# Patient Record
Sex: MALE | Race: WHITE | NOT HISPANIC OR LATINO
[De-identification: names, ages, dates, MRNs, and addresses within clinical notes are randomized per-mention and may not be internally consistent; named-entity substitution may affect disease eponyms.]

---

## 2017-02-06 ENCOUNTER — APPOINTMENT (OUTPATIENT)
Dept: NEUROLOGY | Facility: CLINIC | Age: 62
End: 2017-02-06

## 2017-02-06 VITALS
DIASTOLIC BLOOD PRESSURE: 88 MMHG | HEIGHT: 70 IN | SYSTOLIC BLOOD PRESSURE: 134 MMHG | HEART RATE: 80 BPM | BODY MASS INDEX: 45.1 KG/M2 | WEIGHT: 315 LBS

## 2017-06-23 ENCOUNTER — APPOINTMENT (OUTPATIENT)
Dept: NEUROLOGY | Facility: CLINIC | Age: 62
End: 2017-06-23

## 2017-12-18 ENCOUNTER — APPOINTMENT (OUTPATIENT)
Dept: NEUROLOGY | Facility: CLINIC | Age: 62
End: 2017-12-18

## 2018-02-27 ENCOUNTER — APPOINTMENT (OUTPATIENT)
Dept: NEUROLOGY | Facility: CLINIC | Age: 63
End: 2018-02-27
Payer: COMMERCIAL

## 2018-02-27 VITALS
SYSTOLIC BLOOD PRESSURE: 136 MMHG | HEIGHT: 70 IN | DIASTOLIC BLOOD PRESSURE: 86 MMHG | WEIGHT: 298 LBS | HEART RATE: 79 BPM | BODY MASS INDEX: 42.66 KG/M2

## 2018-02-27 PROCEDURE — 99213 OFFICE O/P EST LOW 20 MIN: CPT

## 2018-12-11 ENCOUNTER — APPOINTMENT (OUTPATIENT)
Dept: NEUROLOGY | Facility: CLINIC | Age: 63
End: 2018-12-11
Payer: COMMERCIAL

## 2018-12-11 VITALS — HEART RATE: 80 BPM | DIASTOLIC BLOOD PRESSURE: 93 MMHG | SYSTOLIC BLOOD PRESSURE: 161 MMHG

## 2018-12-11 VITALS
HEART RATE: 76 BPM | WEIGHT: 313 LBS | SYSTOLIC BLOOD PRESSURE: 151 MMHG | DIASTOLIC BLOOD PRESSURE: 94 MMHG | BODY MASS INDEX: 44.81 KG/M2 | HEIGHT: 70 IN

## 2018-12-11 PROCEDURE — 99214 OFFICE O/P EST MOD 30 MIN: CPT

## 2019-04-02 ENCOUNTER — APPOINTMENT (OUTPATIENT)
Dept: NEUROLOGY | Facility: CLINIC | Age: 64
End: 2019-04-02
Payer: COMMERCIAL

## 2019-04-02 VITALS
WEIGHT: 306 LBS | DIASTOLIC BLOOD PRESSURE: 87 MMHG | HEART RATE: 77 BPM | HEIGHT: 70 IN | SYSTOLIC BLOOD PRESSURE: 151 MMHG | BODY MASS INDEX: 43.81 KG/M2

## 2019-04-02 PROCEDURE — 99213 OFFICE O/P EST LOW 20 MIN: CPT

## 2019-04-02 NOTE — HISTORY OF PRESENT ILLNESS
[FreeTextEntry1] : Patient here for f/u, still having anxiety, LBP.  Alprazolam helps with the anxiety and LBP.  Takes fioricet for HA's, he gets 2-4 weekly

## 2019-04-02 NOTE — PHYSICAL EXAM
[Person] : oriented to person [Place] : oriented to place [Time] : oriented to time [Concentration Intact] : normal concentrating ability [Visual Intact] : visual attention was ~T not ~L decreased [Naming Objects] : no difficulty naming common objects [Repeating Phrases] : no difficulty repeating a phrase [Writing A Sentence] : no difficulty writing a sentence [Fluency] : fluency intact [Comprehension] : comprehension intact [Reading] : reading intact [Past History] : adequate knowledge of personal past history [Cranial Nerves Optic (II)] : visual acuity intact bilaterally,  visual fields full to confrontation, pupils equal round and reactive to light [Cranial Nerves Oculomotor (III)] : extraocular motion intact [Cranial Nerves Trigeminal (V)] : facial sensation intact symmetrically [Cranial Nerves Facial (VII)] : face symmetrical [Cranial Nerves Vestibulocochlear (VIII)] : hearing was intact bilaterally [Cranial Nerves Glossopharyngeal (IX)] : tongue and palate midline [Cranial Nerves Accessory (XI - Cranial And Spinal)] : head turning and shoulder shrug symmetric [Cranial Nerves Hypoglossal (XII)] : there was no tongue deviation with protrusion [Motor Tone] : muscle tone was normal in all four extremities [Motor Strength] : muscle strength was normal in all four extremities [No Muscle Atrophy] : normal bulk in all four extremities [Motor Handedness Right-Handed] : the patient is right hand dominant [Sensation Tactile Decrease] : light touch was intact [Abnormal Walk] : normal gait [Balance] : balance was intact [Past-pointing] : there was no past-pointing [Tremor] : no tremor present [1+] : Ankle jerk left 1+ [Plantar Reflex Right Only] : normal on the right [Plantar Reflex Left Only] : normal on the left

## 2019-04-02 NOTE — ASSESSMENT
[FreeTextEntry1] : Patient stable as he has been for years, on current treatment regimen.  \par \par Refills today, f/u 6 months

## 2019-06-13 ENCOUNTER — RX RENEWAL (OUTPATIENT)
Age: 64
End: 2019-06-13

## 2019-07-25 RX ORDER — ALPRAZOLAM 2 MG/1
2 TABLET ORAL
Qty: 60 | Refills: 0 | Status: ACTIVE | COMMUNITY
Start: 2018-05-30 | End: 1900-01-01

## 2020-02-12 ENCOUNTER — LABORATORY RESULT (OUTPATIENT)
Age: 65
End: 2020-02-12

## 2020-02-12 ENCOUNTER — APPOINTMENT (OUTPATIENT)
Dept: PLASTIC SURGERY | Facility: CLINIC | Age: 65
End: 2020-02-12
Payer: COMMERCIAL

## 2020-02-12 PROCEDURE — 10120 INC&RMVL FB SUBQ TISS SMPL: CPT

## 2020-03-03 NOTE — REASON FOR VISIT
[Procedure: _________] : a [unfilled] procedure visit [FreeTextEntry1] : Pt is present today for excisional biopsy of right middle finger foreign body.

## 2020-03-03 NOTE — PROCEDURE
[FreeTextEntry2] : excision and closure of foreign body right middle finger [FreeTextEntry1] : foreign body right middle finger [FreeTextEntry3] : 1% lidocaine block  [FreeTextEntry7] : Skin lesion right III finger. [FreeTextEntry4] : Minimal  [FreeTextEntry6] : The finger was prepped and the patient was given a digital block with 1% plain lidocaine. A glove finger was placed over the finger to act as a tourniquet. An incision was made over the area of concern. A wedge biopsy was performed. The wound was closed with 5-0 plain gut. A dressing was applied. The tourniquet was removed. The patient tolerated the procedure well

## 2020-03-04 ENCOUNTER — APPOINTMENT (OUTPATIENT)
Dept: PLASTIC SURGERY | Facility: CLINIC | Age: 65
End: 2020-03-04

## 2021-04-20 ENCOUNTER — APPOINTMENT (OUTPATIENT)
Dept: NEUROLOGY | Facility: CLINIC | Age: 66
End: 2021-04-20
Payer: MEDICARE

## 2021-04-20 VITALS — TEMPERATURE: 97.3 F

## 2021-04-20 VITALS
DIASTOLIC BLOOD PRESSURE: 92 MMHG | HEART RATE: 71 BPM | SYSTOLIC BLOOD PRESSURE: 165 MMHG | WEIGHT: 300 LBS | BODY MASS INDEX: 42.95 KG/M2 | HEIGHT: 70 IN

## 2021-04-20 PROCEDURE — 99213 OFFICE O/P EST LOW 20 MIN: CPT

## 2021-04-20 RX ORDER — VALSARTAN AND HYDROCHLOROTHIAZIDE 160; 12.5 MG/1; MG/1
160-12.5 TABLET, FILM COATED ORAL
Refills: 0 | Status: ACTIVE | COMMUNITY

## 2021-04-20 NOTE — PHYSICAL EXAM
[Person] : oriented to person [Place] : oriented to place [Time] : oriented to time [Concentration Intact] : normal concentrating ability [Visual Intact] : visual attention was ~T not ~L decreased [Naming Objects] : no difficulty naming common objects [Repeating Phrases] : no difficulty repeating a phrase [Writing A Sentence] : no difficulty writing a sentence [Fluency] : fluency intact [Comprehension] : comprehension intact [Past History] : adequate knowledge of personal past history [Reading] : reading intact [Cranial Nerves Optic (II)] : visual acuity intact bilaterally,  visual fields full to confrontation, pupils equal round and reactive to light [Cranial Nerves Oculomotor (III)] : extraocular motion intact [Cranial Nerves Trigeminal (V)] : facial sensation intact symmetrically [Cranial Nerves Facial (VII)] : face symmetrical [Cranial Nerves Vestibulocochlear (VIII)] : hearing was intact bilaterally [Cranial Nerves Glossopharyngeal (IX)] : tongue and palate midline [Cranial Nerves Accessory (XI - Cranial And Spinal)] : head turning and shoulder shrug symmetric [Cranial Nerves Hypoglossal (XII)] : there was no tongue deviation with protrusion [Motor Tone] : muscle tone was normal in all four extremities [Motor Strength] : muscle strength was normal in all four extremities [No Muscle Atrophy] : normal bulk in all four extremities [Motor Handedness Right-Handed] : the patient is right hand dominant [Sensation Tactile Decrease] : light touch was intact [Balance] : balance was intact [Abnormal Walk] : normal gait [Past-pointing] : there was no past-pointing [Tremor] : no tremor present [1+] : Ankle jerk left 1+ [Plantar Reflex Right Only] : normal on the right [Plantar Reflex Left Only] : normal on the left

## 2021-04-20 NOTE — ASSESSMENT
[FreeTextEntry1] : Patient has no significant sequelae of hitting his head.  \par \par Will refill xanax 2mg, f/u 6 months.

## 2021-04-20 NOTE — HISTORY OF PRESENT ILLNESS
[FreeTextEntry1] : Patient recently fell in shower, Apr 2, due to slippery new glazed shower tub.  He went to ED and CT head was ok.  He has had no HA or dizziness since and no LOC at time of event. \par \par Patient uses xanax appropriately, 1-2 times daily for stress and anxiety.  \par \par \par

## 2021-06-05 RX ORDER — AZITHROMYCIN 250 MG/1
250 TABLET, FILM COATED ORAL
Qty: 1 | Refills: 0 | Status: ACTIVE | COMMUNITY
Start: 2021-06-05 | End: 1900-01-01

## 2021-09-21 RX ORDER — ALPRAZOLAM 2 MG/1
2 TABLET ORAL
Qty: 60 | Refills: 0 | Status: ACTIVE | COMMUNITY
Start: 2019-04-02 | End: 1900-01-01

## 2021-10-20 RX ORDER — ALPRAZOLAM 2 MG/1
2 TABLET ORAL
Qty: 60 | Refills: 0 | Status: ACTIVE | COMMUNITY
Start: 2018-02-27 | End: 1900-01-01

## 2021-11-16 RX ORDER — ALPRAZOLAM 2 MG/1
2 TABLET ORAL
Qty: 60 | Refills: 0 | Status: ACTIVE | COMMUNITY
Start: 2017-02-06 | End: 1900-01-01

## 2021-11-21 RX ORDER — AZITHROMYCIN 250 MG/1
250 TABLET, FILM COATED ORAL
Qty: 1 | Refills: 0 | Status: ACTIVE | COMMUNITY
Start: 2021-11-21 | End: 1900-01-01

## 2021-11-26 RX ORDER — BUTALBITAL, ACETAMINOPHEN AND CAFFEINE 325; 50; 40 MG/1; MG/1; MG/1
50-325-40 TABLET ORAL EVERY 6 HOURS
Qty: 8 | Refills: 0 | Status: ACTIVE | COMMUNITY
Start: 2021-11-26 | End: 1900-01-01

## 2021-12-02 ENCOUNTER — APPOINTMENT (OUTPATIENT)
Dept: NEUROLOGY | Facility: CLINIC | Age: 66
End: 2021-12-02
Payer: MEDICARE

## 2021-12-02 VITALS
HEIGHT: 70 IN | WEIGHT: 309 LBS | BODY MASS INDEX: 44.24 KG/M2 | DIASTOLIC BLOOD PRESSURE: 93 MMHG | SYSTOLIC BLOOD PRESSURE: 161 MMHG | HEART RATE: 71 BPM

## 2021-12-02 DIAGNOSIS — F41.1 GENERALIZED ANXIETY DISORDER: ICD-10-CM

## 2021-12-02 DIAGNOSIS — G44.229 CHRONIC TENSION-TYPE HEADACHE, NOT INTRACTABLE: ICD-10-CM

## 2021-12-02 PROCEDURE — 99213 OFFICE O/P EST LOW 20 MIN: CPT

## 2021-12-02 RX ORDER — ALPRAZOLAM 2 MG/1
2 TABLET ORAL
Qty: 60 | Refills: 0 | Status: ACTIVE | COMMUNITY
Start: 2018-11-16 | End: 1900-01-01

## 2022-01-10 ENCOUNTER — APPOINTMENT (OUTPATIENT)
Dept: PLASTIC SURGERY | Facility: CLINIC | Age: 67
End: 2022-01-10
Payer: MEDICARE

## 2022-01-10 VITALS
WEIGHT: 310 LBS | SYSTOLIC BLOOD PRESSURE: 167 MMHG | BODY MASS INDEX: 45.91 KG/M2 | OXYGEN SATURATION: 94 % | TEMPERATURE: 98.1 F | DIASTOLIC BLOOD PRESSURE: 93 MMHG | HEART RATE: 75 BPM | HEIGHT: 69 IN

## 2022-01-10 DIAGNOSIS — M79.643 PAIN IN UNSPECIFIED HAND: ICD-10-CM

## 2022-01-10 DIAGNOSIS — M65.9 SYNOVITIS AND TENOSYNOVITIS, UNSPECIFIED: ICD-10-CM

## 2022-01-10 PROCEDURE — 99213 OFFICE O/P EST LOW 20 MIN: CPT | Mod: 25

## 2022-01-10 PROCEDURE — 20550 NJX 1 TENDON SHEATH/LIGAMENT: CPT

## 2022-01-10 RX ORDER — METHYLPREDNISOLONE 4 MG/1
4 TABLET ORAL
Qty: 1 | Refills: 0 | Status: ACTIVE | COMMUNITY
Start: 2022-01-10 | End: 1900-01-01

## 2022-03-08 PROBLEM — M79.643 HAND PAIN: Status: ACTIVE | Noted: 2022-03-08

## 2022-03-08 NOTE — PROCEDURE
[FreeTextEntry1] : Right hand synovitis  [FreeTextEntry2] : Kenalog injection into the synovium of right hand  [FreeTextEntry6] : The area was prepped. 5 mg of Kenalog was injected into the area of synovitis. The patient tolerated the procedue well

## 2022-03-08 NOTE — HISTORY OF PRESENT ILLNESS
[FreeTextEntry1] : Pt presents here today to discuss right hand pain and swelling over the dorsal aspect of his hand right middle finger knuckle.  Pt states the pain and swelling have been present for past 2-3 months.  He was recently evaluated by [facility] where they did x-rays which showed no fractures.  He was told it was a possible tendonitis.  Pt denies any injury to the hand.  Pt states the pain is a 5/10 and states it feels like a constant throbbing sensation.

## 2022-03-08 NOTE — REASON FOR VISIT
[Initial Evaluation] : an initial evaluation [Spouse] : spouse [FreeTextEntry1] : pt is here for right hand pain (dorsal aspect, right middle finger knuckle). pt reports pain present for approximately 2 to 3 months. pt states he recently has been evaluated by [facility], X-ray imaging done, and no Fx indicated, suggested to be possible tendonitis. pt is RHD. reports pain level 5/10, describes pain as a constant throbbing sensation.

## 2022-03-08 NOTE — REVIEW OF SYSTEMS
[Fever] : no fever [Chills] : no chills [Negative] : Heme/Lymph [FreeTextEntry9] : Swelling over the dorsal aspect of the hand around the middle finger knuckle area

## 2022-03-08 NOTE — PHYSICAL EXAM
[NI] : Normal [de-identified] : Right hand +swelling dorsum of the hand middle finger knuckle area. Soft and ballotable c/w synovitis  [de-identified] : as above